# Patient Record
Sex: FEMALE | Race: WHITE | NOT HISPANIC OR LATINO | Employment: OTHER | URBAN - NONMETROPOLITAN AREA
[De-identification: names, ages, dates, MRNs, and addresses within clinical notes are randomized per-mention and may not be internally consistent; named-entity substitution may affect disease eponyms.]

---

## 2018-03-09 ENCOUNTER — HOSPITAL ENCOUNTER (EMERGENCY)
Facility: HOSPITAL | Age: 48
Discharge: HOME | End: 2018-03-10
Attending: EMERGENCY MEDICINE | Admitting: EMERGENCY MEDICINE

## 2018-03-09 ENCOUNTER — APPOINTMENT (OUTPATIENT)
Dept: GENERAL RADIOLOGY | Facility: HOSPITAL | Age: 48
End: 2018-03-09

## 2018-03-09 DIAGNOSIS — R07.9 CHEST PAIN, UNSPECIFIED TYPE: Primary | ICD-10-CM

## 2018-03-09 LAB
ANION GAP SERPL CALCULATED.3IONS-SCNC: 15 MMOL/L (ref 4–13)
APTT PPP: 37.9 SECONDS (ref 24.1–34.8)
BUN BLD-MCNC: 17 MG/DL (ref 5–21)
BUN/CREAT SERPL: 28.3 (ref 7–25)
CALCIUM SPEC-SCNC: 9.2 MG/DL (ref 8.4–10.4)
CHLORIDE SERPL-SCNC: 104 MMOL/L (ref 98–110)
CK MB SERPL-CCNC: 0.54 NG/ML (ref 0–5)
CO2 SERPL-SCNC: 20 MMOL/L (ref 24–31)
CREAT BLD-MCNC: 0.6 MG/DL (ref 0.5–1.4)
DEPRECATED RDW RBC AUTO: 52.5 FL (ref 40–54)
EOSINOPHIL # BLD MANUAL: 0.08 10*3/MM3 (ref 0–0.7)
EOSINOPHIL # BLD MANUAL: 0.08 10*3/MM3 (ref 0–0.7)
EOSINOPHIL NFR BLD MANUAL: 1 % (ref 0–4)
EOSINOPHIL NFR BLD MANUAL: 1 % (ref 0–4)
ERYTHROCYTE [DISTWIDTH] IN BLOOD BY AUTOMATED COUNT: 14.6 % (ref 12–15)
GFR SERPL CREATININE-BSD FRML MDRD: 108 ML/MIN/1.73
GLUCOSE BLD-MCNC: 129 MG/DL (ref 70–100)
HCT VFR BLD AUTO: 40.5 % (ref 37–47)
HGB BLD-MCNC: 13.3 G/DL (ref 12–16)
HOLD SPECIMEN: NORMAL
HOLD SPECIMEN: NORMAL
INR PPP: 1.85 (ref 0.91–1.09)
LIPASE SERPL-CCNC: 275 U/L (ref 23–203)
LYMPHOCYTES # BLD MANUAL: 3.09 10*3/MM3 (ref 0.72–4.86)
LYMPHOCYTES # BLD MANUAL: 3.09 10*3/MM3 (ref 0.72–4.86)
LYMPHOCYTES NFR BLD MANUAL: 38 % (ref 15–45)
LYMPHOCYTES NFR BLD MANUAL: 38 % (ref 15–45)
LYMPHOCYTES NFR BLD MANUAL: 8 % (ref 4–12)
LYMPHOCYTES NFR BLD MANUAL: 8 % (ref 4–12)
MCH RBC QN AUTO: 31.9 PG (ref 28–32)
MCHC RBC AUTO-ENTMCNC: 32.8 G/DL (ref 33–36)
MCV RBC AUTO: 97.1 FL (ref 82–98)
MONOCYTES # BLD AUTO: 0.65 10*3/MM3 (ref 0.19–1.3)
MONOCYTES # BLD AUTO: 0.65 10*3/MM3 (ref 0.19–1.3)
NEUTROPHILS # BLD AUTO: 3.82 10*3/MM3 (ref 1.87–8.4)
NEUTROPHILS # BLD AUTO: 3.82 10*3/MM3 (ref 1.87–8.4)
NEUTROPHILS NFR BLD MANUAL: 44 % (ref 39–78)
NEUTROPHILS NFR BLD MANUAL: 44 % (ref 39–78)
NEUTS BAND NFR BLD MANUAL: 3 % (ref 0–10)
NEUTS BAND NFR BLD MANUAL: 3 % (ref 0–10)
PLAT MORPH BLD: NORMAL
PLAT MORPH BLD: NORMAL
PLATELET # BLD AUTO: 271 10*3/MM3 (ref 130–400)
PMV BLD AUTO: 12.2 FL (ref 6–12)
POTASSIUM BLD-SCNC: 3.9 MMOL/L (ref 3.5–5.3)
PROTHROMBIN TIME: 22 SECONDS (ref 11.9–14.6)
RBC # BLD AUTO: 4.17 10*6/MM3 (ref 4.2–5.4)
RBC MORPH BLD: NORMAL
RBC MORPH BLD: NORMAL
SODIUM BLD-SCNC: 139 MMOL/L (ref 135–145)
TROPONIN I SERPL-MCNC: <0.012 NG/ML (ref 0–0.03)
VARIANT LYMPHS NFR BLD MANUAL: 6 % (ref 0–5)
VARIANT LYMPHS NFR BLD MANUAL: 6 % (ref 0–5)
WBC MORPH BLD: NORMAL
WBC MORPH BLD: NORMAL
WBC NRBC COR # BLD: 8.13 10*3/MM3 (ref 4.8–10.8)
WHOLE BLOOD HOLD SPECIMEN: NORMAL
WHOLE BLOOD HOLD SPECIMEN: NORMAL

## 2018-03-09 PROCEDURE — 84484 ASSAY OF TROPONIN QUANT: CPT | Performed by: EMERGENCY MEDICINE

## 2018-03-09 PROCEDURE — 99284 EMERGENCY DEPT VISIT MOD MDM: CPT

## 2018-03-09 PROCEDURE — 85730 THROMBOPLASTIN TIME PARTIAL: CPT | Performed by: EMERGENCY MEDICINE

## 2018-03-09 PROCEDURE — 80048 BASIC METABOLIC PNL TOTAL CA: CPT | Performed by: EMERGENCY MEDICINE

## 2018-03-09 PROCEDURE — 71045 X-RAY EXAM CHEST 1 VIEW: CPT

## 2018-03-09 PROCEDURE — 85007 BL SMEAR W/DIFF WBC COUNT: CPT | Performed by: EMERGENCY MEDICINE

## 2018-03-09 PROCEDURE — 93010 ELECTROCARDIOGRAM REPORT: CPT | Performed by: INTERNAL MEDICINE

## 2018-03-09 PROCEDURE — 85025 COMPLETE CBC W/AUTO DIFF WBC: CPT | Performed by: EMERGENCY MEDICINE

## 2018-03-09 PROCEDURE — 93005 ELECTROCARDIOGRAM TRACING: CPT | Performed by: EMERGENCY MEDICINE

## 2018-03-09 PROCEDURE — 83690 ASSAY OF LIPASE: CPT | Performed by: EMERGENCY MEDICINE

## 2018-03-09 PROCEDURE — 82553 CREATINE MB FRACTION: CPT | Performed by: EMERGENCY MEDICINE

## 2018-03-09 PROCEDURE — 85610 PROTHROMBIN TIME: CPT | Performed by: EMERGENCY MEDICINE

## 2018-03-09 RX ORDER — WARFARIN SODIUM 5 MG/1
5 TABLET ORAL
COMMUNITY

## 2018-03-09 RX ORDER — ASPIRIN 81 MG/1
81 TABLET, CHEWABLE ORAL DAILY
COMMUNITY

## 2018-03-09 RX ORDER — ROPINIROLE 1 MG/1
1 TABLET, FILM COATED ORAL NIGHTLY
COMMUNITY

## 2018-03-09 RX ORDER — QUETIAPINE FUMARATE 100 MG/1
800 TABLET, FILM COATED ORAL NIGHTLY
COMMUNITY

## 2018-03-09 RX ORDER — WARFARIN SODIUM 4 MG/1
4 TABLET ORAL
COMMUNITY

## 2018-03-09 RX ORDER — DIVALPROEX SODIUM 500 MG/1
1000 TABLET, DELAYED RELEASE ORAL 2 TIMES DAILY
COMMUNITY

## 2018-03-09 RX ORDER — DULOXETIN HYDROCHLORIDE 60 MG/1
60 CAPSULE, DELAYED RELEASE ORAL DAILY
COMMUNITY

## 2018-03-09 RX ORDER — BENZONATATE 200 MG/1
200 CAPSULE ORAL 3 TIMES DAILY PRN
COMMUNITY

## 2018-03-09 RX ORDER — CLONIDINE HYDROCHLORIDE 0.1 MG/1
0.1 TABLET ORAL NIGHTLY
COMMUNITY

## 2018-03-09 RX ORDER — LOVASTATIN 20 MG/1
20 TABLET ORAL NIGHTLY
COMMUNITY

## 2018-03-09 RX ORDER — BUSPIRONE HYDROCHLORIDE 5 MG/1
5 TABLET ORAL 2 TIMES DAILY
COMMUNITY

## 2018-03-09 RX ORDER — OXYBUTYNIN CHLORIDE 5 MG/1
5 TABLET ORAL NIGHTLY
COMMUNITY

## 2018-03-09 RX ORDER — PANTOPRAZOLE SODIUM 40 MG/1
40 TABLET, DELAYED RELEASE ORAL DAILY
COMMUNITY

## 2018-03-09 RX ORDER — SUCRALFATE ORAL 1 G/10ML
1 SUSPENSION ORAL 4 TIMES DAILY
COMMUNITY

## 2018-03-10 VITALS
SYSTOLIC BLOOD PRESSURE: 114 MMHG | RESPIRATION RATE: 20 BRPM | BODY MASS INDEX: 24.11 KG/M2 | HEIGHT: 72 IN | OXYGEN SATURATION: 98 % | TEMPERATURE: 97.1 F | WEIGHT: 178 LBS | DIASTOLIC BLOOD PRESSURE: 66 MMHG | HEART RATE: 82 BPM

## 2018-03-10 LAB — TROPONIN I SERPL-MCNC: <0.012 NG/ML (ref 0–0.03)

## 2018-03-10 PROCEDURE — 84484 ASSAY OF TROPONIN QUANT: CPT | Performed by: EMERGENCY MEDICINE

## 2018-03-10 RX ORDER — ACETAMINOPHEN 500 MG
1000 TABLET ORAL ONCE
Status: DISCONTINUED | OUTPATIENT
Start: 2018-03-10 | End: 2018-03-10

## 2018-03-10 NOTE — ED PROVIDER NOTES
Subjective   49 y/o female with history of an MI in the past, COPD arrives for evaluation of several hours of left sided sharp/stabbing cp without radiation or provoking or alleviating factors that abated upon arrival to the Ed. She stated this pain was different than her prior Mi. She denies falls, trauma, hemoptysis, history of PE or DVT, recent surgeries or cancer treatments, recent fevers, chills, cough, LE pain or swelling, medication changes, nausea, diaophresis or other issues. She arrives in NAD actually asking for dc upon my arrival into the room.                   Review of Systems   Cardiovascular: Positive for chest pain. Negative for palpitations and leg swelling.   Gastrointestinal: Negative for abdominal pain, nausea and vomiting.   Musculoskeletal: Negative for back pain.   Neurological: Negative for dizziness and light-headedness.   All other systems reviewed and are negative.      Past Medical History:   Diagnosis Date   • Above knee amputation status, left    • Aneurysm of anterior cerebral artery    • COPD (chronic obstructive pulmonary disease)    • Coronary artery disease    • Hypertension    • Myocardial infarction    • Seizures    • Stroke        Allergies   Allergen Reactions   • Codeine Angioedema   • Omeprazole Unknown (See Comments)     Pt states doesn't work     • Sumatriptan Succinate Other (See Comments)     Headache'     • Latex Rash   • Penicillins Rash       Past Surgical History:   Procedure Laterality Date   • BRAIN SURGERY     • HYSTERECTOMY         History reviewed. No pertinent family history.    Social History     Social History   • Marital status: Single     Social History Main Topics   • Smoking status: Current Some Day Smoker     Packs/day: 0.50   • Smokeless tobacco: Never Used   • Alcohol use No   • Drug use: No   • Sexual activity: Defer     Other Topics Concern   • Not on file           Objective   Physical Exam   Constitutional: She is oriented to person, place, and  time. She appears well-developed.   HENT:   Head: Normocephalic and atraumatic.   Eyes: EOM are normal. Pupils are equal, round, and reactive to light.   Neck: Normal range of motion. Neck supple.   Cardiovascular: Normal rate, regular rhythm, normal heart sounds and intact distal pulses.  Exam reveals no gallop and no friction rub.    No murmur heard.  Pulmonary/Chest: Effort normal and breath sounds normal. No respiratory distress. She has no wheezes. She has no rales. She exhibits tenderness.   Abdominal: Soft. Bowel sounds are normal. She exhibits no distension. There is no tenderness.   Musculoskeletal: Normal range of motion. She exhibits no edema.   Left leg AKA   Neurological: She is alert and oriented to person, place, and time.   Skin: Skin is warm. Capillary refill takes less than 2 seconds. No rash noted. No erythema.   Psychiatric: She has a normal mood and affect.   Vitals reviewed.      ECG 12 Lead    Date/Time: 3/9/2018 6:32 AM  Performed by: DIGNA HUMPHRIES  Authorized by: DIGNA HUMPHRIES   Interpreted by physician  Rhythm: sinus rhythm  Rate: normal  BPM: 88                 ED Course  ED Course                HEART Score (for prediction of 6-week risk of major adverse cardiac event) reviewed and/or performed as part of the patient evaluation and treatment planning process.  The result associated with this review/performance is: 2       MDM  Number of Diagnoses or Management Options  Chest pain, unspecified type:   Diagnosis management comments: Chest pain has been abated since arrival. I did convince the patient to stay for 2 CE and EKG which were unchanged. Of note, the computer system was updated and I cannot locate the second ekg although one was done. I did offer the patient, after her second enzyme, a stress test. However, the patient tells me she had a stress test within 5 months and a normal cath one year ago and states she does not wish to stay. She is aware that given her  "history, even two sets of normal enzymes and ekgs does not rule out a cardiac condition and that a stress test would need to be performed. The patient states she \"feels fine and I will see my cardiologist\" she is aware that failure to undergo further testing could result in loss of life, limb, death or delay in diagnosis. She was again offered a stress and has again refused.       XR Chest 1 View   Final Result    1. No radiographic evidence of acute cardiopulmonary process.              This report was finalized on 03/10/2018 06:42 by Dr. Fazal Peters MD.     Labs Reviewed  BASIC METABOLIC PANEL - Abnormal; Notable for the following:      Glucose                       129 (*)                CO2                           20.0 (*)               BUN/Creatinine Ratio          28.3 (*)               Anion Gap                     15.0 (*)            All other components within normal limits         Narrative: GFR Normal >60                  Chronic Kidney Disease <60                  Kidney Failure <15  PROTIME-INR - Abnormal; Notable for the following:      Protime                       22.0 (*)               INR                           1.85 (*)            All other components within normal limits  APTT - Abnormal; Notable for the following:      PTT                           37.9 (*)            All other components within normal limits  LIPASE - Abnormal; Notable for the following:      Lipase                        275 (*)             All other components within normal limits  CBC WITH AUTO DIFFERENTIAL - Abnormal; Notable for the following:      RBC                           4.17 (*)               MCHC                          32.8 (*)               MPV                           12.2 (*)            All other components within normal limits         Narrative: The previously reported component NRBC is no longer being reported.  MANUAL DIFFERENTIAL - Abnormal; Notable for the following:      Atypical Lymphocyte %    "      6.0 (*)             All other components within normal limits  MANUAL DIFFERENTIAL - Abnormal; Notable for the following:      Atypical Lymphocyte %         6.0 (*)             All other components within normal limits  TROPONIN (IN-HOUSE) - Normal  CK MB - Normal         Narrative: CKMB Index not indicated  TROPONIN (IN-HOUSE) - Normal  RAINBOW DRAW         Narrative: The following orders were created for panel order [place] Draw.                  Procedure                               Abnormality         Status                                     ---------                               -----------         ------                                     Light Blue Top([id number])                                   Final result                               Green Top (Gel)([id number])                                  Final result                               Lavender Top([id number])                                     Final result                               Red Top([id number])                                          Final result                                                 Please view results for these tests on the individual orders.  LIGHT BLUE TOP  GREEN TOP  LAVENDER TOP  RED TOP  CBC AND DIFFERENTIAL         Amount and/or Complexity of Data Reviewed  Clinical lab tests: ordered and reviewed  Tests in the radiology section of CPT®: ordered and reviewed        Final diagnoses:   Chest pain, unspecified type            Yaya Reeder MD  03/12/18 0637

## 2018-03-10 NOTE — DISCHARGE INSTRUCTIONS
Marcela,    Please return for worsening pain, changes in location of pain or return of pain. As we talked about, your EKG and cardiac enzymes did not reveal a heart attack today. However, even with these studies there is a chance of a heart issue. Given this, I did offer you a stress test but you have refused. You have elected to see your doctors and schedule a stress test outside the hospital. Please understand that while your pain is improved at this time, things may change, minutes, hours, days or weeks after you leave the emergency room. Thus it is imperative that you return for worsening symptoms.

## 2021-06-21 ENCOUNTER — HOSPITAL ENCOUNTER (EMERGENCY)
Facility: HOSPITAL | Age: 51
Discharge: HOME | End: 2021-06-21
Attending: STUDENT IN AN ORGANIZED HEALTH CARE EDUCATION/TRAINING PROGRAM | Admitting: STUDENT IN AN ORGANIZED HEALTH CARE EDUCATION/TRAINING PROGRAM

## 2021-06-21 ENCOUNTER — APPOINTMENT (OUTPATIENT)
Dept: GENERAL RADIOLOGY | Facility: HOSPITAL | Age: 51
End: 2021-06-21

## 2021-06-21 ENCOUNTER — APPOINTMENT (OUTPATIENT)
Dept: CT IMAGING | Facility: HOSPITAL | Age: 51
End: 2021-06-21

## 2021-06-21 VITALS
BODY MASS INDEX: 23.03 KG/M2 | WEIGHT: 170 LBS | OXYGEN SATURATION: 99 % | DIASTOLIC BLOOD PRESSURE: 74 MMHG | TEMPERATURE: 98.4 F | RESPIRATION RATE: 18 BRPM | HEART RATE: 71 BPM | SYSTOLIC BLOOD PRESSURE: 110 MMHG | HEIGHT: 72 IN

## 2021-06-21 DIAGNOSIS — M25.521 RIGHT ELBOW PAIN: ICD-10-CM

## 2021-06-21 DIAGNOSIS — M79.604 PAIN OF RIGHT LOWER EXTREMITY: ICD-10-CM
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
## 2021-06-21 DIAGNOSIS — R51.9 ACUTE NONINTRACTABLE HEADACHE, UNSPECIFIED HEADACHE TYPE: ICD-10-CM

## 2021-06-21 LAB
HOLD SPECIMEN: NORMAL
INR PPP: 1 (ref 0.8–1.2)
PROTHROMBIN TIME: 13.1 SECONDS (ref 11.1–15.3)
VALPROATE SERPL-MCNC: 84.9 MCG/ML (ref 50–125)
WHOLE BLOOD HOLD SPECIMEN: NORMAL

## 2021-06-21 PROCEDURE — 70450 CT HEAD/BRAIN W/O DYE: CPT

## 2021-06-21 PROCEDURE — 73502 X-RAY EXAM HIP UNI 2-3 VIEWS: CPT

## 2021-06-21 PROCEDURE — 85610 PROTHROMBIN TIME: CPT | Performed by: STUDENT IN AN ORGANIZED HEALTH CARE EDUCATION/TRAINING PROGRAM

## 2021-06-21 PROCEDURE — 80164 ASSAY DIPROPYLACETIC ACD TOT: CPT | Performed by: STUDENT IN AN ORGANIZED HEALTH CARE EDUCATION/TRAINING PROGRAM

## 2021-06-21 PROCEDURE — 73080 X-RAY EXAM OF ELBOW: CPT

## 2021-06-21 PROCEDURE — 99283 EMERGENCY DEPT VISIT LOW MDM: CPT

## 2021-06-21 NOTE — ED NOTES
Patient states to call her mother for a ride home and to bring a bag. Patient's mother was contacted by phone. She states she will be there in about 35 minutes.      Greta Marti RN  06/21/21 1926
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
